# Patient Record
Sex: MALE | Race: WHITE | NOT HISPANIC OR LATINO | Employment: FULL TIME | URBAN - METROPOLITAN AREA
[De-identification: names, ages, dates, MRNs, and addresses within clinical notes are randomized per-mention and may not be internally consistent; named-entity substitution may affect disease eponyms.]

---

## 2022-10-24 ENCOUNTER — OFFICE VISIT (OUTPATIENT)
Dept: ORTHOPEDIC SURGERY | Facility: CLINIC | Age: 57
End: 2022-10-24

## 2022-10-24 VITALS
BODY MASS INDEX: 31.75 KG/M2 | WEIGHT: 234.4 LBS | DIASTOLIC BLOOD PRESSURE: 84 MMHG | HEIGHT: 72 IN | SYSTOLIC BLOOD PRESSURE: 130 MMHG

## 2022-10-24 DIAGNOSIS — M25.511 RIGHT SHOULDER PAIN, UNSPECIFIED CHRONICITY: Primary | ICD-10-CM

## 2022-10-24 DIAGNOSIS — M75.91 SUPRASPINATUS TENDINITIS, RIGHT: ICD-10-CM

## 2022-10-24 PROCEDURE — 99204 OFFICE O/P NEW MOD 45 MIN: CPT | Performed by: ORTHOPAEDIC SURGERY

## 2022-10-24 RX ORDER — MELOXICAM 15 MG/1
TABLET ORAL
Qty: 90 TABLET | Refills: 2 | Status: SHIPPED | OUTPATIENT
Start: 2022-10-24

## 2022-10-24 RX ORDER — LISINOPRIL 20 MG/1
TABLET ORAL
COMMUNITY
Start: 2022-09-05 | End: 2022-11-09 | Stop reason: SDUPTHER

## 2022-10-24 RX ORDER — LAMOTRIGINE 150 MG/1
75 TABLET ORAL
COMMUNITY

## 2022-10-24 NOTE — PROGRESS NOTES
"      [facility] Note    Subjective     CC: Pain of the Right Shoulder      HPI  Samy Mcdonnell is a 57 y.o. male who presents with new problem of: right shoulder pain.  Onset: atraumatic and gradual in nature. The issue has been ongoing for 2 week(s). Pain is a 5/10 on the pain scale. Pain is described as aching and shooting. Associated symptoms include pain and stiffness. The pain is worse with walking, standing, sitting, sleeping, working, rising from seated position and any movement of the joint; resting and heat improve the pain. Previous treatments have included: NSAIDS.    I have reviewed the following portions of the patient's history:History of Present Illness and review of systems.    No injury.  Pain for 2 weeks.  He works as a .  Right-hand-dominant.  Pain radiates to the neck and collarbone    Review of Systems   Constitutional: Negative.  Negative for chills, fatigue and fever.   HENT: Negative.  Negative for congestion and dental problem.    Eyes: Negative.  Negative for blurred vision.   Respiratory: Negative.  Negative for shortness of breath.    Cardiovascular: Negative.  Negative for leg swelling.   Gastrointestinal: Negative.  Negative for abdominal pain.   Endocrine: Negative.  Negative for polyuria.   Genitourinary: Negative.  Negative for difficulty urinating.   Musculoskeletal: Positive for arthralgias.   Skin: Negative.    Allergic/Immunologic: Negative.    Neurological: Negative.    Hematological: Negative.  Negative for adenopathy.   Psychiatric/Behavioral: Negative.  Negative for behavioral problems.       ROS:    Constiutional:Pt denies fever, chills, nausea, or vomiting.  MSK:as above      Objective      Past Medical History  Past Medical History:   Diagnosis Date   • Hypertension    • Rotator cuff syndrome 2022-10-10   • Sleep apnea    • Tear of meniscus of knee rt knee aprox 13 yrs ago         Physical Exam  /84   Ht 182.9 cm (72\")   Wt 106 kg " (234 lb 6.4 oz)   BMI 31.79 kg/m²     Body mass index is 31.79 kg/m².    Patient is well nourished and well developed.        Ortho Exam  Right shoulder lacks 20 degrees of full forward flexion abduction.  Rotator cuff strength 4+ out of 5.  Positive impingement.  No tenderness to the AC joint    Imaging/Labs/EMG Reviewed:  Imaging Results (Last 24 Hours)     Procedure Component Value Units Date/Time    XR Shoulder 2+ View Right [[id number]] Resulted: 10/24/22 0812     Updated: 10/24/22 0812    Narrative:      Right Shoulder X-Ray  Indication: Pain  AP, scapular Y, and axillary lateral views    Findings:  No fracture  No bony lesion  Normal soft tissues  Normal joint spaces    No prior studies were available for comparison.              Assessment:  1. Right shoulder pain, unspecified chronicity    2. Supraspinatus tendinitis, right        Plan:  1. I ordered prescription strength Mobic for him.  2. I ordered physical therapy at [facility] for him.  3. If he fails to get better consider cortisone injection.  His motion and strength are good enough to make a severe rotator cuff tear not likely    Follow Up:   Return in about 1 month (around 11/24/2022).      Medical Decision Making  Management Options : Low - OT or PT Therapy  and Moderate - RX Drug management         Chris Spears M.D., [facility]  Orthopedic Surgeon  Fellowship Trained Sports Medicine  [facility]  Orthopedics and Sports Medicine  [address] [place] [address], [address]  [place] [address]    EMR Dragon/Transcription disclaimer:  Much of this encounter note is an electronic transcription of spoken language to printed text. Electronic transcription of spoken language may permit erroneous, or at times, nonsensical words or phrases to be inadvertently transcribed. Although I have reviewed the note for such errors, some may still exist.

## 2022-10-26 ENCOUNTER — TREATMENT (OUTPATIENT)
Dept: PHYSICAL THERAPY | Facility: CLINIC | Age: 57
End: 2022-10-26

## 2022-10-26 DIAGNOSIS — M79.601 RIGHT ARM PAIN: ICD-10-CM

## 2022-10-26 DIAGNOSIS — G54.0 BRACHIAL PLEXOPATHY: Primary | ICD-10-CM

## 2022-10-26 PROCEDURE — 97110 THERAPEUTIC EXERCISES: CPT | Performed by: PHYSICAL THERAPIST

## 2022-10-26 PROCEDURE — 97162 PT EVAL MOD COMPLEX 30 MIN: CPT | Performed by: PHYSICAL THERAPIST

## 2022-10-26 PROCEDURE — 97140 MANUAL THERAPY 1/> REGIONS: CPT | Performed by: PHYSICAL THERAPIST

## 2022-10-26 NOTE — PROGRESS NOTES
Physical Therapy Initial Evaluation and Plan of Care    [facility] [place]  [address] [address] [phone]    Patient: Samy Mcdonnell   : 1965  Diagnosis/ICD-10 Code:  Chronic right shoulder pain [M25.511, G89.29]  Referring practitioner: Chris Spears MD    Subjective Evaluation    History of Present Illness  Date of onset: 10/12/2022    Subjective comment: Has had neck and right shoulder pain for the past 2 weeks or so.  Denies recent illness.  Was also having a cold sensation down his arm and into his hand.  This has improved since starting Meloxicam.  Symptoms have improved overall since they started.  Unable to recall a specific mechanism of injury.  Neck feels stiff and has difficulty using the right shoulder still.  Sleeping better.   Patient Occupation: -[employer] Quality of life: excellent    Pain  Alleviating factors: Keeping arm by side when walking; Meloxicam.  Exacerbated by: Worse in PM; swinging arm when walking; shoulder flexion; left neck rotation (looking at monitor for work); pushing off chair to stand up; pulling self to get our of car; donning/doffing shirt.  Progression: improved    Treatments  Previous treatment: medication and home therapy  Current treatment: medication  Patient Goals  Patient goals for therapy: decreased pain, increased motion and increased strength  Patient goal: Hiking; taking pictures in nature; using treking pole         QD:  33    Objective          Neurological Testing     Sensation   Cervical/Thoracic   Left   Intact: light touch    Right   Intact: light touch    Reflexes   Left   Biceps (C5/C6): normal (2+)  Brachioradialis (C6): normal (2+)  Triceps (C7): normal (2+)    Right   Biceps (C5/C6): normal (2+)  Brachioradialis (C6): normal (2+)  Triceps (C7): normal (2+)    Active Range of Motion   Cervical/Thoracic Spine   Cervical    Flexion: 35 degrees   Extension:  30 degrees   Left rotation: 50 degrees   Right rotation: 55 degrees     Additional Active Range of Motion Details  *R GHJ flexion:  140 deg    Strength/Myotome Testing     Left Shoulder     Planes of Motion   Abduction: 5     Right Shoulder     Planes of Motion   Abduction: 5     Left Elbow   Flexion: 5  Extension: 5    Right Elbow   Flexion: 5  Extension: 5    Left Wrist/Hand   Wrist flexion: 5    Right Wrist/Hand   Wrist flexion: 5          Assessment & Plan     Assessment  Impairments: abnormal or restricted ROM, activity intolerance, lacks appropriate home exercise program and pain with function  Functional Limitations: walking, uncomfortable because of pain, sitting and standing  Assessment details: Mr. Mcdonnell is a very pleasant RHD 57 year old male that presents to physical therapy with a recent onset of insidious neck and right shoulder pain.  PMH was covered and reviewed during interview.  Neurological exam is unremarkable.  Has notable hypomobility in the cervical spine and right shoulder.  Strength is excellent in the shoulder.  Has exquisite tenderness to palpation in the anterior triangle of the right lateral cervical spine.  Appears to have signs and symptoms consistent with a brachial plexopathy vs a cervical nn root.  Will focus care on improving cervical spine mobility and right shoulder mobility as the inflammation continues to improve with use of Meloxicam.  Prognosis: good    Goals  Plan Goals: STGs:  1.)  QuickDASH improved x 1 MCID in 6 weeks.  2.)  Have 80 deg of cervical spine rotation bilaterally in 6 weeks.  LTGs:  1.)  Self report at least 80% improvement in symptoms and function during ADLs/iADLs in 8 weeks.  2.)  Have no neck or right shoulder pain during sitting, standing and/or walking in 12 weeks.    Plan  Therapy options: will be seen for skilled therapy services  Planned modality interventions: thermotherapy (hydrocollator packs) and cryotherapy  Planned therapy interventions:  stretching, strengthening, spinal/joint mobilization, manual therapy, abdominal trunk stabilization, functional ROM exercises and home exercise program  Frequency: 1x week  Duration in visits: 12  Duration in weeks: 12  Treatment plan discussed with: patient        Manual Therapy:    9     mins  49090;  Therapeutic Exercise:    15     mins  36535;     Neuromuscular Roshni:        mins  55133;    Therapeutic Activity:          mins  21896;     Gait Training:           mins  89102;     Ultrasound:          mins  65365;    Electrical Stimulation:         mins  15580 ( );  Dry Needling          mins self-pay    Timed Treatment:   24   mins   Total Treatment:     59   mins    PT SIGNATURE: Lion Huggins, MALAIKA   DATE TREATMENT INITIATED: 10/26/2022    Initial Certification  Certification Period: 1/24/2023  I certify that the therapy services are furnished while this patient is under my care.  The services outlined above are required by this patient, and will be reviewed every 90 days.     PHYSICIAN: Chris Spears MD  NPI: [id number]                                      DATE:    Please sign and return via fax to [phone].. Thank you, [facility].

## 2022-11-02 ENCOUNTER — TREATMENT (OUTPATIENT)
Dept: PHYSICAL THERAPY | Facility: CLINIC | Age: 57
End: 2022-11-02

## 2022-11-02 DIAGNOSIS — M79.601 RIGHT ARM PAIN: Primary | ICD-10-CM

## 2022-11-02 DIAGNOSIS — G54.0 BRACHIAL PLEXOPATHY: ICD-10-CM

## 2022-11-02 PROCEDURE — 97110 THERAPEUTIC EXERCISES: CPT | Performed by: PHYSICAL THERAPIST

## 2022-11-02 PROCEDURE — 97140 MANUAL THERAPY 1/> REGIONS: CPT | Performed by: PHYSICAL THERAPIST

## 2022-11-02 PROCEDURE — 97530 THERAPEUTIC ACTIVITIES: CPT | Performed by: PHYSICAL THERAPIST

## 2022-11-02 NOTE — PROGRESS NOTES
Physical Therapy Daily Progress Note    Patient: Samy Mcdonnell   : 1965  Diagnosis/ICD-10 Code:  Right arm pain [M79.601]  Referring practitioner: Chris Spaers MD  Date of Initial Visit: Type: THERAPY  Noted: 10/26/2022  Today's Date: 2022  Patient seen for 2 sessions         Samy Mcdonnell reports that he has less severity of pain during the day.  Still has a sensation of cold in the hand with reaching forward.  Pain with pressing down through his arm to get up from a chair.      Subjective     Objective   See Exercise, Manual, and Modality Logs for complete treatment.       Assessment/Plan  Focused visit on improving brachial plexus sensitivity via manual therapy.  Combined with exercises to improve tension loading tolerance of the plexus, scapular mm endurance and general neck muscle endurance.  Will f/u with Mr. Mcdonnell in 2 weeks./    *Updated HEP with written and verbal instruction (included in total time billed as TE below).       Manual Therapy:    10     mins  71081;  Therapeutic Exercise:    24     mins  01868;     Neuromuscular Roshni:        mins  53959;    Therapeutic Activity:     12     mins  74625;     Gait Training:           mins  92611;     Ultrasound:          mins  59907;    Electrical Stimulation:         mins  83769 ( );  Dry Needling          mins self-pay    Timed Treatment:   46   mins   Total Treatment:     46   mins    Lion Huggins PT  Physical Therapist

## 2022-11-09 ENCOUNTER — OFFICE VISIT (OUTPATIENT)
Dept: FAMILY MEDICINE CLINIC | Facility: CLINIC | Age: 57
End: 2022-11-09

## 2022-11-09 VITALS
DIASTOLIC BLOOD PRESSURE: 68 MMHG | WEIGHT: 241 LBS | SYSTOLIC BLOOD PRESSURE: 126 MMHG | HEIGHT: 72 IN | TEMPERATURE: 98 F | OXYGEN SATURATION: 99 % | HEART RATE: 60 BPM | BODY MASS INDEX: 32.64 KG/M2

## 2022-11-09 DIAGNOSIS — Z12.5 PROSTATE CANCER SCREENING: ICD-10-CM

## 2022-11-09 DIAGNOSIS — Z00.00 WELL ADULT EXAM: Primary | ICD-10-CM

## 2022-11-09 DIAGNOSIS — I10 PRIMARY HYPERTENSION: ICD-10-CM

## 2022-11-09 PROBLEM — U07.1 COVID-19 VIRUS INFECTION: Status: ACTIVE | Noted: 2022-11-09

## 2022-11-09 PROBLEM — U07.1 COVID-19 VIRUS INFECTION: Status: RESOLVED | Noted: 2022-11-09 | Resolved: 2022-11-09

## 2022-11-09 PROBLEM — H26.9 CATARACTS, BILATERAL: Status: ACTIVE | Noted: 2022-11-09

## 2022-11-09 PROBLEM — H93.13 TINNITUS OF BOTH EARS: Status: ACTIVE | Noted: 2022-11-09

## 2022-11-09 PROBLEM — M72.2 PLANTAR FASCIITIS OF RIGHT FOOT: Status: ACTIVE | Noted: 2022-11-09

## 2022-11-09 PROBLEM — F31.72 BIPOLAR DISORDER, IN FULL REMISSION, MOST RECENT EPISODE HYPOMANIC: Status: ACTIVE | Noted: 2022-11-09

## 2022-11-09 PROCEDURE — 90686 IIV4 VACC NO PRSV 0.5 ML IM: CPT | Performed by: FAMILY MEDICINE

## 2022-11-09 PROCEDURE — 99386 PREV VISIT NEW AGE 40-64: CPT | Performed by: FAMILY MEDICINE

## 2022-11-09 PROCEDURE — 90471 IMMUNIZATION ADMIN: CPT | Performed by: FAMILY MEDICINE

## 2022-11-09 RX ORDER — LISINOPRIL 20 MG/1
20 TABLET ORAL DAILY
Qty: 90 TABLET | Refills: 3 | Status: SHIPPED | OUTPATIENT
Start: 2022-11-09

## 2022-11-09 NOTE — PATIENT INSTRUCTIONS
"Hypertension, Adult  High blood pressure (hypertension) is when the force of blood pumping through the arteries is too strong. The arteries are the blood vessels that carry blood from the heart throughout the body. Hypertension forces the heart to work harder to pump blood and may cause arteries to become narrow or stiff. Untreated or uncontrolled hypertension can cause a heart attack, heart failure, a stroke, kidney disease, and other problems.  A blood pressure reading consists of a higher number over a lower number. Ideally, your blood pressure should be below 120/80. The first (\"top\") number is called the systolic pressure. It is a measure of the pressure in your arteries as your heart beats. The second (\"bottom\") number is called the diastolic pressure. It is a measure of the pressure in your arteries as the heart relaxes.  What are the causes?  The exact cause of this condition is not known. There are some conditions that result in or are related to high blood pressure.  What increases the risk?  Some risk factors for high blood pressure are under your control. The following factors may make you more likely to develop this condition:  Smoking.  Having type 2 diabetes mellitus, high cholesterol, or both.  Not getting enough exercise or physical activity.  Being overweight.  Having too much fat, sugar, calories, or salt (sodium) in your diet.  Drinking too much alcohol.  Some risk factors for high blood pressure may be difficult or impossible to change. Some of these factors include:  Having chronic kidney disease.  Having a family history of high blood pressure.  Age. Risk increases with age.  Race. You may be at higher risk if you are .  Gender. Men are at higher risk than women before age 45. After age 65, women are at higher risk than men.  Having obstructive sleep apnea.  Stress.  What are the signs or symptoms?  High blood pressure may not cause symptoms. Very high blood pressure " (hypertensive crisis) may cause:  Headache.  Anxiety.  Shortness of breath.  Nosebleed.  Nausea and vomiting.  Vision changes.  Severe chest pain.  Seizures.  How is this diagnosed?  This condition is diagnosed by measuring your blood pressure while you are seated, with your arm resting on a flat surface, your legs uncrossed, and your feet flat on the floor. The cuff of the blood pressure monitor will be placed directly against the skin of your upper arm at the level of your heart. It should be measured at least twice using the same arm. Certain conditions can cause a difference in blood pressure between your right and left arms.  Certain factors can cause blood pressure readings to be lower or higher than normal for a short period of time:  When your blood pressure is higher when you are in a health care provider's office than when you are at home, this is called white coat hypertension. Most people with this condition do not need medicines.  When your blood pressure is higher at home than when you are in a health care provider's office, this is called masked hypertension. Most people with this condition may need medicines to control blood pressure.  If you have a high blood pressure reading during one visit or you have normal blood pressure with other risk factors, you may be asked to:  Return on a different day to have your blood pressure checked again.  Monitor your blood pressure at home for 1 week or longer.  If you are diagnosed with hypertension, you may have other blood or imaging tests to help your health care provider understand your overall risk for other conditions.  How is this treated?  This condition is treated by making healthy lifestyle changes, such as eating healthy foods, exercising more, and reducing your alcohol intake. Your health care provider may prescribe medicine if lifestyle changes are not enough to get your blood pressure under control, and if:  Your systolic blood pressure is above  130.  Your diastolic blood pressure is above 80.  Your personal target blood pressure may vary depending on your medical conditions, your age, and other factors.  Follow these instructions at home:  Eating and drinking    Eat a diet that is high in fiber and potassium, and low in sodium, added sugar, and fat. An example eating plan is called the DASH (Dietary Approaches to Stop Hypertension) diet. To eat this way:  Eat plenty of fresh fruits and vegetables. Try to fill one half of your plate at each meal with fruits and vegetables.  Eat whole grains, such as whole-wheat pasta, brown rice, or whole-grain bread. Fill about one fourth of your plate with whole grains.  Eat or drink low-fat dairy products, such as skim milk or low-fat yogurt.  Avoid fatty cuts of meat, processed or cured meats, and poultry with skin. Fill about one fourth of your plate with lean proteins, such as fish, chicken without skin, beans, eggs, or tofu.  Avoid pre-made and processed foods. These tend to be higher in sodium, added sugar, and fat.  Reduce your daily sodium intake. Most people with hypertension should eat less than 1,500 mg of sodium a day.  Do not drink alcohol if:  Your health care provider tells you not to drink.  You are pregnant, may be pregnant, or are planning to become pregnant.  If you drink alcohol:  Limit how much you use to:  0-1 drink a day for women.  0-2 drinks a day for men.  Be aware of how much alcohol is in your drink. In the [place] one drink equals one 12 oz bottle of beer (355 mL), one 5 oz glass of wine (148 mL), or one 1½ oz glass of hard liquor (44 mL).  Lifestyle    Work with your health care provider to maintain a healthy body weight or to lose weight. Ask what an ideal weight is for you.  Get at least 30 minutes of exercise most days of the week. Activities may include walking, swimming, or biking.  Include exercise to strengthen your muscles (resistance exercise), such as Pilates or lifting weights, as  part of your weekly exercise routine. Try to do these types of exercises for 30 minutes at least 3 days a week.  Do not use any products that contain nicotine or tobacco, such as cigarettes, e-cigarettes, and chewing tobacco. If you need help quitting, ask your health care provider.  Monitor your blood pressure at home as told by your health care provider.  Keep all follow-up visits as told by your health care provider. This is important.  Medicines  Take over-the-counter and prescription medicines only as told by your health care provider. Follow directions carefully. Blood pressure medicines must be taken as prescribed.  Do not skip doses of blood pressure medicine. Doing this puts you at risk for problems and can make the medicine less effective.  Ask your health care provider about side effects or reactions to medicines that you should watch for.  Contact a health care provider if you:  Think you are having a reaction to a medicine you are taking.  Have headaches that keep coming back (recurring).  Feel dizzy.  Have swelling in your ankles.  Have trouble with your vision.  Get help right away if you:  Develop a severe headache or confusion.  Have unusual weakness or numbness.  Feel faint.  Have severe pain in your chest or abdomen.  Vomit repeatedly.  Have trouble breathing.  Summary  Hypertension is when the force of blood pumping through your arteries is too strong. If this condition is not controlled, it may put you at risk for serious complications.  Your personal target blood pressure may vary depending on your medical conditions, your age, and other factors. For most people, a normal blood pressure is less than 120/80.  Hypertension is treated with lifestyle changes, medicines, or a combination of both. Lifestyle changes include losing weight, eating a healthy, low-sodium diet, exercising more, and limiting alcohol.  This information is not intended to replace advice given to you by your health care  "provider. Make sure you discuss any questions you have with your health care provider.  Document Revised: 08/28/2019 Document Reviewed: 08/28/2019  [employer] Patient Education © 2022 [employer]  BMI for Adults  What is BMI?  Body mass index (BMI) is a number that is calculated from a person's weight and height. BMI can help estimate how much of a person's weight is composed of fat. BMI does not measure body fat directly. Rather, it is an alternative to procedures that directly measure body fat, which can be difficult and expensive.  BMI can help identify people who may be at higher risk for certain medical problems.  What are BMI measurements used for?  BMI is used as a screening tool to identify possible weight problems. It helps determine whether a person is obese, overweight, a healthy weight, or underweight.  BMI is useful for:  Identifying a weight problem that may be related to a medical condition or may increase the risk for medical problems.  Promoting changes, such as changes in diet and exercise, to help reach a healthy weight. BMI screening can be repeated to see if these changes are working.  How is BMI calculated?  BMI involves measuring your weight in relation to your height. Both height and weight are measured, and the BMI is calculated from those numbers. This can be done either in English ([place] or metric measurements. Note that charts and online BMI calculators are available to help you find your BMI quickly and easily without having to do these calculations yourself.  To calculate your BMI in English ([place] measurements:    Measure your weight in pounds (lb).  Multiply the number of pounds by 703.  For example, for a person who weighs 180 lb, multiply that number by 703, which equals 126,540.  Measure your height in inches. Then multiply that number by itself to get a measurement called \"inches squared.\"  For example, for a person who is 70 inches tall, the \"inches squared\" measurement is 70 " "inches x 70 inches, which equals 4,900 inches squared.  Divide the total from step 2 (number of lb x 703) by the total from step 3 (inches squared): 126,540 ÷ 4,900 = 25.8. This is your BMI.  To calculate your BMI in metric measurements:  Measure your weight in kilograms (kg).  Measure your height in meters (m). Then multiply that number by itself to get a measurement called \"meters squared.\"  For example, for a person who is 1.75 m tall, the \"meters squared\" measurement is 1.75 m x 1.75 m, which is equal to 3.1 meters squared.  Divide the number of kilograms (your weight) by the meters squared number. In this example: 70 ÷ 3.1 = 22.6. This is your BMI.  What do the results mean?  BMI charts are used to identify whether you are underweight, normal weight, overweight, or obese. The following guidelines will be used:  Underweight: BMI less than 18.5.  Normal weight: BMI between 18.5 and 24.9.  Overweight: BMI between 25 and 29.9.  Obese: BMI of 30 or above.  Keep these notes in mind:  Weight includes both fat and muscle, so someone with a muscular build, such as an athlete, may have a BMI that is higher than 24.9. In cases like these, BMI is not an accurate measure of body fat.  To determine if excess body fat is the cause of a BMI of 25 or higher, further assessments may need to be done by a health care provider.  BMI is usually interpreted in the same way for men and women.  Where to find more information  For more information about BMI, including tools to quickly calculate your BMI, go to these websites:  Centers for Disease Control and Prevention: www.cdc.gov  American Heart Association: www.heart.org  National Heart, Lung, and Blood [place]: www.nhlbi.nih.gov  Summary  Body mass index (BMI) is a number that is calculated from a person's weight and height.  BMI may help estimate how much of a person's weight is composed of fat. BMI can help identify those who may be at higher risk for certain medical " problems.  BMI can be measured using English measurements or metric measurements.  BMI charts are used to identify whether you are underweight, normal weight, overweight, or obese.  This information is not intended to replace advice given to you by your health care provider. Make sure you discuss any questions you have with your health care provider.  Document Revised: 09/09/2020 Document Reviewed: 07/17/2020  [employer] Patient Education © 2022 [employer]

## 2022-11-09 NOTE — PROGRESS NOTES
Samy Mcdonnell is a 57 y.o. male who presents today to establish care and complete annual exam.    Chief Complaint   Patient presents with   • well adult exam     Est care        Patient recently moved back to [place] from [place] in February after living there for 10 years. He is currently being treated for HTN, sleep apnea, and shoulder pain. He has been treated for bipolar and has been weaning off of medications as his provider in [place] did not feel he has bipolar. He has been reluctant to stop the Lamictal due to feeling like his mental health is in a good place. He follows with psych. He is following with ortho and PT for the right shoulder pain. Patient exercises by going to the gym 4 times a week for cardio and weight lifting. He eats a varied and somewhat healthy diet. He sees the dentist and optometrist regularly. He sees dermatology periodically. He has no acute complaints today.        Review of Systems   Constitutional: Negative for fever and unexpected weight loss.   HENT: Negative for congestion, ear pain and sore throat.    Eyes: Negative for visual disturbance.   Respiratory: Negative for cough, shortness of breath and wheezing.    Cardiovascular: Negative for chest pain and palpitations.   Gastrointestinal: Negative for abdominal pain, blood in stool, constipation, diarrhea, nausea, vomiting and GERD.   Endocrine: Negative for polydipsia and polyuria.   Genitourinary: Negative for difficulty urinating.   Musculoskeletal: Positive for arthralgias (right shoulder pain). Negative for joint swelling.   Skin: Negative for rash and skin lesions.   Allergic/Immunologic: Negative for environmental allergies.   Neurological: Negative for seizures and syncope.   Hematological: Does not bruise/bleed easily.   Psychiatric/Behavioral: Negative for suicidal ideas.        PHQ-9 Depression Screening  Little interest or pleasure in doing things? 0-->not at all   Feeling down, depressed, or hopeless? 0-->not at  all   Trouble falling or staying asleep, or sleeping too much?     Feeling tired or having little energy?     Poor appetite or overeating?     Feeling bad about yourself - or that you are a failure or have let yourself or your family down?     Trouble concentrating on things, such as reading the newspaper or watching television?     Moving or speaking so slowly that other people could have noticed? Or the opposite - being so fidgety or restless that you have been moving around a lot more than usual?     Thoughts that you would be better off dead, or of hurting yourself in some way?     PHQ-9 Total Score 0   If you checked off any problems, how difficult have these problems made it for you to do your work, take care of things at home, or get along with other people?         Past Medical History:   Diagnosis Date   • Hypertension    • Rotator cuff syndrome 2022-10-10   • Sleep apnea    • Tear of meniscus of knee rt knee aprox 13 yrs ago        Past Surgical History:   Procedure Laterality Date   • BLEPHAROPLASTY Left 2020   • CATARACT EXTRACTION, BILATERAL Bilateral 2017   • HEMORRHOIDECTOMY  1992   • KNEE SURGERY Right 2009    menicus   • LIPOMA EXCISION  2020   • PANNICULECTOMY  2021        Family History   Problem Relation Age of Onset   • Cancer Mother         cervical cancer   • Hyperlipidemia Father    • Sudden death Sister         murder   • Other Maternal Grandmother         unknown   • Sudden death Maternal Grandfather         garage door fell on him   • Cancer Paternal Grandmother         unknown cancer   • Other Paternal Grandfather         unknown   • Other Daughter         Igg low        Social History     Socioeconomic History   • Marital status:    Tobacco Use   • Smoking status: Never     Passive exposure: Never   • Smokeless tobacco: Never   Substance and Sexual Activity   • Alcohol use: Yes     Alcohol/week: 1.0 standard drink     Types: 1 Cans of beer per week   • Drug use: Never   • Sexual  "activity: Yes     Partners: Female     Birth control/protection: None     Comment: 1 female partner in the last year        Current Outpatient Medications on File Prior to Visit   Medication Sig Dispense Refill   • lamoTRIgine (LaMICtal) 150 MG tablet Take 75 mg by mouth.     • lisinopril (PRINIVIL,ZESTRIL) 20 MG tablet      • meloxicam (MOBIC) 15 MG tablet 1 PO Daily with food. 90 tablet 2     No current facility-administered medications on file prior to visit.       Allergies   Allergen Reactions   • Trazodone Other (See Comments)        Visit Vitals  /68   Pulse 60   Temp 98 °F (36.7 °C)   Ht 182.9 cm (72\")   Wt 109 kg (241 lb)   SpO2 99%   BMI 32.69 kg/m²      Body mass index is 32.69 kg/m².    Physical Exam  Constitutional:       General: He is not in acute distress.     Appearance: He is well-developed. He is not diaphoretic.   HENT:      Head: Atraumatic.   Cardiovascular:      Rate and Rhythm: Normal rate and regular rhythm.      Heart sounds: Normal heart sounds. No murmur heard.    No friction rub. No gallop.   Pulmonary:      Effort: Pulmonary effort is normal. No respiratory distress.      Breath sounds: Normal breath sounds. No stridor. No wheezing, rhonchi or rales.   Abdominal:      General: Bowel sounds are normal. There is no distension.      Palpations: Abdomen is soft. There is no mass.      Tenderness: There is no abdominal tenderness. There is no guarding or rebound.      Hernia: No hernia is present.   Musculoskeletal:      Cervical back: Normal range of motion and neck supple.   Skin:     General: Skin is warm and dry.   Neurological:      Mental Status: He is alert and oriented to person, place, and time.   Psychiatric:         Behavior: Behavior normal.          No results found for this or any previous visit.     Problems Addressed this Visit        Cardiac and Vasculature    Hypertension       Genitourinary and Reproductive     Prostate cancer screening       Health Encounters    " Well adult exam - Primary     The patient is here for health maintenance visit.  Currently, the patient consumes a healthy diet and has an adequate exercise regimen.  Screening lab work is ordered.  Immunizations were reviewed today.  Advice and education was given regarding nutrition, aerobic exercise, routine dental evaluations, routine eye exams, reproductive health, cardiovascular risk reduction, sunscreen use, self skin examination (annual dermatology evaluations) and seatbelt use (general overall safety).  Further recommendations will be given if needed after lab evaluation.  Annual wellness evaluation is recommended.          Diagnoses       Codes Comments    Well adult exam    -  Primary ICD-10-CM: Z00.00  ICD-9-CM: V70.0     Primary hypertension     ICD-10-CM: I10  ICD-9-CM: 401.9     Prostate cancer screening     ICD-10-CM: Z12.5  ICD-9-CM: V76.44           Return in about 1 year (around 11/9/2023) for Annual.    Irwin Garcia MD  11/9/2022

## 2022-11-16 ENCOUNTER — TREATMENT (OUTPATIENT)
Dept: PHYSICAL THERAPY | Facility: CLINIC | Age: 57
End: 2022-11-16

## 2022-11-16 DIAGNOSIS — M79.601 RIGHT ARM PAIN: Primary | ICD-10-CM

## 2022-11-16 DIAGNOSIS — G54.0 BRACHIAL PLEXOPATHY: ICD-10-CM

## 2022-11-16 PROCEDURE — 97140 MANUAL THERAPY 1/> REGIONS: CPT | Performed by: PHYSICAL THERAPIST

## 2022-11-16 PROCEDURE — 97530 THERAPEUTIC ACTIVITIES: CPT | Performed by: PHYSICAL THERAPIST

## 2022-11-16 PROCEDURE — 97110 THERAPEUTIC EXERCISES: CPT | Performed by: PHYSICAL THERAPIST

## 2022-11-16 NOTE — PROGRESS NOTES
Physical Therapy Daily Progress Note    Patient: Samy Mcdonnell   : 1965  Diagnosis/ICD-10 Code:  Right arm pain [M79.601]  Referring practitioner: Chris Spears MD  Date of Initial Visit: Type: THERAPY  Noted: 10/26/2022  Today's Date: 2022  Patient seen for 3 sessions         Samy Mcdonnell reports having an intense episode of neck pain a few days ago. But, not as severe as it has been before starting physical therapy. Feels better today.  Hand still gets cold, but no longer has tingling in the arm and hand.  Feels like he is still getting better overall.    Subjective     Objective   See Exercise, Manual, and Modality Logs for complete treatment.       Assessment/Plan  Continued emphasis on improving cervical spine unloading via manual techniques.  Combined with exercises to improve posterior R GHJ mm endurance and C/S mm endurance.    *Updated HEP with written and verbal instruction (included in total time billed as TE below).       Manual Therapy:    10     mins  13799;  Therapeutic Exercise:    24     mins  25199;     Neuromuscular Roshni:        mins  25830;    Therapeutic Activity:     9     mins  13022;     Gait Training:           mins  45543;     Ultrasound:          mins  92694;    Electrical Stimulation:         mins  66553 ( );  Dry Needling          mins self-pay    Timed Treatment:   43   mins   Total Treatment:     43   mins    Lion Huggins PT  Physical Therapist

## 2022-11-28 ENCOUNTER — OFFICE VISIT (OUTPATIENT)
Dept: ORTHOPEDIC SURGERY | Facility: CLINIC | Age: 57
End: 2022-11-28

## 2022-11-28 VITALS
SYSTOLIC BLOOD PRESSURE: 149 MMHG | WEIGHT: 240.3 LBS | DIASTOLIC BLOOD PRESSURE: 93 MMHG | HEIGHT: 72 IN | BODY MASS INDEX: 32.55 KG/M2

## 2022-11-28 DIAGNOSIS — M25.511 RIGHT SHOULDER PAIN, UNSPECIFIED CHRONICITY: Primary | ICD-10-CM

## 2022-11-28 DIAGNOSIS — M75.91 SUPRASPINATUS TENDINITIS, RIGHT: ICD-10-CM

## 2022-11-28 PROCEDURE — 99213 OFFICE O/P EST LOW 20 MIN: CPT | Performed by: ORTHOPAEDIC SURGERY

## 2022-11-28 NOTE — PROGRESS NOTES
"    [facility] Note        Subjective     CC: Follow-up (5 week recheck - Supraspinatus tendinitis, right )      KIMBERLEY Mcdonnell is a 57 y.o. male.     Overall, patient's symptoms are much improved.  Physical therapy and the Mobic is helping.  He goes to physical therapy at [facility].    ROS:    Constiutional:Pt denies fever, chills, nausea, or vomiting.  MSK:as above        Objective      Past Medical History  Past Medical History:   Diagnosis Date   • Hypertension    • Rotator cuff syndrome 2022-10-10   • Sleep apnea    • Tear of meniscus of knee rt knee aprox 13 yrs ago         Physical Exam  /93   Ht 182.9 cm (72.01\")   Wt 109 kg (240 lb 4.8 oz)   BMI 32.58 kg/m²     Body mass index is 32.58 kg/m².    Patient is well nourished and well developed.        Ortho Exam  Right shoulder with full motion.  Positive impingement.  Full strength.    Imaging/Labs/EMG Reviewed:  Imaging Results (Last 24 Hours)     ** No results found for the last 24 hours. **            Assessment    Assessment:  1. Right shoulder pain, unspecified chronicity    2. Supraspinatus tendinitis, right        Plan:  1. He is doing well.  He will finish out another month of physical therapy.  He will follow-up with me as needed.  He has a refill on his Mobic.      Chris Spears MD  11/28/22  08:05 EST      Dictated Utilizing Dragon Dictation.  "

## 2022-11-30 ENCOUNTER — LAB (OUTPATIENT)
Dept: LAB | Facility: HOSPITAL | Age: 57
End: 2022-11-30

## 2022-11-30 ENCOUNTER — TREATMENT (OUTPATIENT)
Dept: PHYSICAL THERAPY | Facility: CLINIC | Age: 57
End: 2022-11-30

## 2022-11-30 DIAGNOSIS — Z00.00 WELL ADULT EXAM: ICD-10-CM

## 2022-11-30 DIAGNOSIS — M79.601 RIGHT ARM PAIN: Primary | ICD-10-CM

## 2022-11-30 DIAGNOSIS — Z12.5 PROSTATE CANCER SCREENING: ICD-10-CM

## 2022-11-30 DIAGNOSIS — G54.0 BRACHIAL PLEXOPATHY: ICD-10-CM

## 2022-11-30 LAB
ALBUMIN SERPL-MCNC: 4.5 G/DL (ref 3.5–5.2)
ALBUMIN/GLOB SERPL: 2 G/DL
ALP SERPL-CCNC: 104 U/L (ref 39–117)
ALT SERPL W P-5'-P-CCNC: 50 U/L (ref 1–41)
ANION GAP SERPL CALCULATED.3IONS-SCNC: 9 MMOL/L (ref 5–15)
AST SERPL-CCNC: 33 U/L (ref 1–40)
BASOPHILS # BLD AUTO: 0.05 10*3/MM3 (ref 0–0.2)
BASOPHILS NFR BLD AUTO: 0.9 % (ref 0–1.5)
BILIRUB SERPL-MCNC: 0.4 MG/DL (ref 0–1.2)
BUN SERPL-MCNC: 17 MG/DL (ref 6–20)
BUN/CREAT SERPL: 13.9 (ref 7–25)
CALCIUM SPEC-SCNC: 9.5 MG/DL (ref 8.6–10.5)
CHLORIDE SERPL-SCNC: 108 MMOL/L (ref 98–107)
CHOLEST SERPL-MCNC: 181 MG/DL (ref 0–200)
CO2 SERPL-SCNC: 26 MMOL/L (ref 22–29)
CREAT SERPL-MCNC: 1.22 MG/DL (ref 0.76–1.27)
DEPRECATED RDW RBC AUTO: 38.4 FL (ref 37–54)
EGFRCR SERPLBLD CKD-EPI 2021: 70 ML/MIN/1.73
EOSINOPHIL # BLD AUTO: 0.14 10*3/MM3 (ref 0–0.4)
EOSINOPHIL NFR BLD AUTO: 2.5 % (ref 0.3–6.2)
ERYTHROCYTE [DISTWIDTH] IN BLOOD BY AUTOMATED COUNT: 12.8 % (ref 12.3–15.4)
GLOBULIN UR ELPH-MCNC: 2.2 GM/DL
GLUCOSE SERPL-MCNC: 89 MG/DL (ref 65–99)
HBA1C MFR BLD: 5.1 % (ref 4.8–5.6)
HCT VFR BLD AUTO: 46 % (ref 37.5–51)
HDLC SERPL-MCNC: 38 MG/DL (ref 40–60)
HGB BLD-MCNC: 15.6 G/DL (ref 13–17.7)
IMM GRANULOCYTES # BLD AUTO: 0.02 10*3/MM3 (ref 0–0.05)
IMM GRANULOCYTES NFR BLD AUTO: 0.4 % (ref 0–0.5)
LDLC SERPL CALC-MCNC: 117 MG/DL (ref 0–100)
LDLC/HDLC SERPL: 3.01 {RATIO}
LYMPHOCYTES # BLD AUTO: 1.36 10*3/MM3 (ref 0.7–3.1)
LYMPHOCYTES NFR BLD AUTO: 23.9 % (ref 19.6–45.3)
MCH RBC QN AUTO: 28.5 PG (ref 26.6–33)
MCHC RBC AUTO-ENTMCNC: 33.9 G/DL (ref 31.5–35.7)
MCV RBC AUTO: 84.1 FL (ref 79–97)
MONOCYTES # BLD AUTO: 0.61 10*3/MM3 (ref 0.1–0.9)
MONOCYTES NFR BLD AUTO: 10.7 % (ref 5–12)
NEUTROPHILS NFR BLD AUTO: 3.52 10*3/MM3 (ref 1.7–7)
NEUTROPHILS NFR BLD AUTO: 61.6 % (ref 42.7–76)
NRBC BLD AUTO-RTO: 0 /100 WBC (ref 0–0.2)
PLATELET # BLD AUTO: 243 10*3/MM3 (ref 140–450)
PMV BLD AUTO: 9.1 FL (ref 6–12)
POTASSIUM SERPL-SCNC: 4 MMOL/L (ref 3.5–5.2)
PROT SERPL-MCNC: 6.7 G/DL (ref 6–8.5)
PSA SERPL-MCNC: 0.68 NG/ML (ref 0–4)
RBC # BLD AUTO: 5.47 10*6/MM3 (ref 4.14–5.8)
SODIUM SERPL-SCNC: 143 MMOL/L (ref 136–145)
TRIGL SERPL-MCNC: 143 MG/DL (ref 0–150)
TSH SERPL DL<=0.05 MIU/L-ACNC: 1.76 UIU/ML (ref 0.27–4.2)
VLDLC SERPL-MCNC: 26 MG/DL (ref 5–40)
WBC NRBC COR # BLD: 5.7 10*3/MM3 (ref 3.4–10.8)

## 2022-11-30 PROCEDURE — 80061 LIPID PANEL: CPT

## 2022-11-30 PROCEDURE — G0103 PSA SCREENING: HCPCS

## 2022-11-30 PROCEDURE — 85025 COMPLETE CBC W/AUTO DIFF WBC: CPT

## 2022-11-30 PROCEDURE — 83036 HEMOGLOBIN GLYCOSYLATED A1C: CPT

## 2022-11-30 PROCEDURE — 97140 MANUAL THERAPY 1/> REGIONS: CPT | Performed by: PHYSICAL THERAPIST

## 2022-11-30 PROCEDURE — 97110 THERAPEUTIC EXERCISES: CPT | Performed by: PHYSICAL THERAPIST

## 2022-11-30 PROCEDURE — 80053 COMPREHEN METABOLIC PANEL: CPT

## 2022-11-30 PROCEDURE — 97530 THERAPEUTIC ACTIVITIES: CPT | Performed by: PHYSICAL THERAPIST

## 2022-11-30 PROCEDURE — 84443 ASSAY THYROID STIM HORMONE: CPT

## 2022-11-30 NOTE — PROGRESS NOTES
Physical Therapy Daily Progress Note    Patient: Samy Mcdonnell   : 1965  Diagnosis/ICD-10 Code:  Right arm pain [M79.601]  Referring practitioner: Chris Spears MD  Date of Initial Visit: Type: THERAPY  Noted: 10/26/2022  Today's Date: 2022  Patient seen for 4 sessions         Samy Mcdonnell reports feeling pretty good since starting physical therapy.  Was able to lift some heavy boxes and had no soreness or pain the next day.  No longer having pain with pushing off with right arm to get out of a chair.  No longer having pain with elevating arm overhead, donning/doffing shirt and swinging arm when walking.      Aggs:  -sitting with arm overhead (right neck stiffens)  -walking on incline (right sided neck soreness)  -pushing through right arm to get off floor    Subjective     Objective   See Exercise, Manual, and Modality Logs for complete treatment.     *AROM C/S flx/ext/R rot/L rot:  45/70/75/85  *AROM GHJ flx/ER/HBB:       Assessment/Plan  Mr. Mcdonnell has attended physical therapy for a total of 4 visits for chronic neck and right shoulder pain.  Has improved cervical spine mobility in all planes.  Needs end-range R GHJ flx mobility without pain.  Otherwise, has localized symptoms to the right side of the cervical spine.    *Updated HEP with written and verbal instruction (included in total time billed as TE below).       Manual Therapy:    10     mins  32988;  Therapeutic Exercise:    24     mins  28349;     Neuromuscular Roshni:        mins  02115;    Therapeutic Activity:     9     mins  65009;     Gait Training:           mins  07586;     Ultrasound:          mins  11632;    Electrical Stimulation:         mins  59044 ( );  Dry Needling          mins self-pay    Timed Treatment:   43   mins   Total Treatment:     43   mins    Lion Huggins PT  Physical Therapist

## 2022-12-14 ENCOUNTER — TREATMENT (OUTPATIENT)
Dept: PHYSICAL THERAPY | Facility: CLINIC | Age: 57
End: 2022-12-14

## 2022-12-14 ENCOUNTER — TELEPHONE (OUTPATIENT)
Dept: FAMILY MEDICINE CLINIC | Facility: CLINIC | Age: 57
End: 2022-12-14
Payer: COMMERCIAL

## 2022-12-14 DIAGNOSIS — M79.601 RIGHT ARM PAIN: Primary | ICD-10-CM

## 2022-12-14 DIAGNOSIS — G54.0 BRACHIAL PLEXOPATHY: ICD-10-CM

## 2022-12-14 PROCEDURE — 97110 THERAPEUTIC EXERCISES: CPT | Performed by: PHYSICAL THERAPIST

## 2022-12-14 PROCEDURE — 97530 THERAPEUTIC ACTIVITIES: CPT | Performed by: PHYSICAL THERAPIST

## 2022-12-14 PROCEDURE — 97140 MANUAL THERAPY 1/> REGIONS: CPT | Performed by: PHYSICAL THERAPIST

## 2022-12-14 NOTE — TELEPHONE ENCOUNTER
Hub/front can read       \"\"Please let the patient know that labs that were drawn at previous visit were stable.  There was a small increase in ALT which is a liver function number.  This number can be elevated for many different reasons.  Patient should avoid alcohol intake as well as Tylenol use.  I recommend that he follow-up in 3 months to recheck liver function numbers.  Patient should otherwise continue current treatment plan.  If patient has any questions they can contact me.  \"\"\"

## 2022-12-14 NOTE — PROGRESS NOTES
Physical Therapy Daily Progress Note    Patient: Samy Mcdonnell   : 1965  Diagnosis/ICD-10 Code:  Right arm pain [M79.601]  Referring practitioner: Chris Spears MD  Date of Initial Visit: Type: THERAPY  Noted: 10/26/2022  Today's Date: 2022  Patient seen for 5 sessions         Samy Mcdonnell reports that his neck and shoulder feel quite a bit better since last visit.  Forgot to take Meloxicam for almost a week and was doing well.  Had some aggravation of the neck/shoulder region with lifting boxes.  Took a Meloxicam afterward that eased his symptoms.      Subjective     Objective   See Exercise, Manual, and Modality Logs for complete treatment.       Assessment/Plan  Focused visit on reducing sensitivity along the R BP pathway at the common 3 compression sites.  Combined with exercises to improve cervical spine strength and R GHJ strength.  Will hold on physical therapy as Mr. Mcdonnell is pleased with his progress to day.  Will be happy to see Mr. Mcdonnell should any symptoms worsen.    *Updated HEP with written and verbal instruction (included in total time billed as TE below).         Manual Therapy:    10     mins  75084;  Therapeutic Exercise:    15     mins  17801;     Neuromuscular Roshni:        mins  57214;    Therapeutic Activity:     15     mins  03803;     Gait Training:           mins  95547;     Ultrasound:          mins  43707;    Electrical Stimulation:         mins  96530 ( );  Dry Needling          mins self-pay    Timed Treatment:   40   mins   Total Treatment:     40   mins    Lion Huggins, PT  Physical Therapist